# Patient Record
Sex: FEMALE | Race: AMERICAN INDIAN OR ALASKA NATIVE
[De-identification: names, ages, dates, MRNs, and addresses within clinical notes are randomized per-mention and may not be internally consistent; named-entity substitution may affect disease eponyms.]

---

## 2019-04-21 ENCOUNTER — HOSPITAL ENCOUNTER (EMERGENCY)
Dept: HOSPITAL 31 - C.ER | Age: 54
LOS: 1 days | Discharge: HOME | End: 2019-04-22
Payer: COMMERCIAL
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
## 2019-04-21 DIAGNOSIS — S00.83XA: Primary | ICD-10-CM

## 2019-04-22 VITALS — OXYGEN SATURATION: 98 %

## 2019-04-22 VITALS — DIASTOLIC BLOOD PRESSURE: 95 MMHG | SYSTOLIC BLOOD PRESSURE: 172 MMHG | TEMPERATURE: 98.7 F | HEART RATE: 86 BPM

## 2019-04-22 VITALS — RESPIRATION RATE: 20 BRPM
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
## 2019-04-22 NOTE — CT
Date of service: 



04/21/2019



PROCEDURE:  CT HEAD WITHOUT CONTRAST.



HISTORY:

head injury



COMPARISON:

None available.



TECHNIQUE:

Axial computed tomography images were obtained through the head/brain 

without intravenous contrast.  



Radiation dose:



Total exam DLP = 1059.67 mGy-cm.



This CT exam was performed using one or more of the following dose 

reduction techniques: Automated exposure control, adjustment of the 

mA and/or kV according to patient size, and/or use of iterative 

reconstruction technique.



FINDINGS:



HEMORRHAGE:

No intracranial hemorrhage. 



BRAIN:

Gray-white matter differentiation is preserved.  There is no mass, 

mass effect or abnormal extra-axial fluid collection.  There is no 

territorial infarction. The midline sagittal structures are normal.



VENTRICLES:

The ventricles are normal in size, shape and configuration.



CALVARIUM:

There is no calvarial fracture.  Moderate sized left frontal and 

periorbital soft tissue hematoma. 



PARANASAL SINUSES:

There is mild mucosal thickening in the right posterior ethmoid air 

cell.  The remaining included paranasal sinuses are predominantly 

clear.



MASTOID AIR CELLS:

Predominantly clear.



OTHER FINDINGS:

None.



IMPRESSION:

No acute intracranial abnormality.  Moderate size left frontal and 

periorbital soft tissue hematoma.



A preliminary report was provided by [facility].